# Patient Record
Sex: FEMALE | Race: WHITE | Employment: OTHER | URBAN - METROPOLITAN AREA
[De-identification: names, ages, dates, MRNs, and addresses within clinical notes are randomized per-mention and may not be internally consistent; named-entity substitution may affect disease eponyms.]

---

## 2019-03-12 ENCOUNTER — HOSPITAL ENCOUNTER (EMERGENCY)
Age: 24
Discharge: HOME | End: 2019-03-13
Attending: EMERGENCY MEDICINE
Payer: COMMERCIAL

## 2019-03-12 ENCOUNTER — APPOINTMENT (OUTPATIENT)
Dept: GENERAL RADIOLOGY | Age: 24
End: 2019-03-12
Attending: PHYSICIAN ASSISTANT
Payer: COMMERCIAL

## 2019-03-12 VITALS
DIASTOLIC BLOOD PRESSURE: 67 MMHG | BODY MASS INDEX: 19.15 KG/M2 | OXYGEN SATURATION: 98 % | RESPIRATION RATE: 16 BRPM | TEMPERATURE: 102 F | HEIGHT: 59 IN | HEART RATE: 104 BPM | WEIGHT: 95 LBS | SYSTOLIC BLOOD PRESSURE: 131 MMHG

## 2019-03-12 DIAGNOSIS — J10.1 INFLUENZA A: Primary | ICD-10-CM

## 2019-03-12 DIAGNOSIS — J18.9 COMMUNITY ACQUIRED PNEUMONIA, UNSPECIFIED LATERALITY: ICD-10-CM

## 2019-03-12 LAB
FLUAV AG NPH QL IA: POSITIVE
FLUBV AG NOSE QL IA: NEGATIVE

## 2019-03-12 PROCEDURE — 71046 X-RAY EXAM CHEST 2 VIEWS: CPT

## 2019-03-12 PROCEDURE — 87804 INFLUENZA ASSAY W/OPTIC: CPT

## 2019-03-12 PROCEDURE — 99284 EMERGENCY DEPT VISIT MOD MDM: CPT

## 2019-03-12 PROCEDURE — 87081 CULTURE SCREEN ONLY: CPT

## 2019-03-13 RX ORDER — OSELTAMIVIR PHOSPHATE 6 MG/ML
75 FOR SUSPENSION ORAL 2 TIMES DAILY
Qty: 125 ML | Refills: 0 | Status: SHIPPED | OUTPATIENT
Start: 2019-03-13 | End: 2019-03-18

## 2019-03-13 RX ORDER — AZITHROMYCIN 200 MG/5ML
5 POWDER, FOR SUSPENSION ORAL DAILY
Qty: 12.5 ML | Refills: 0 | Status: SHIPPED | OUTPATIENT
Start: 2019-03-13 | End: 2019-03-13

## 2019-03-13 RX ORDER — TRIPROLIDINE/PSEUDOEPHEDRINE 2.5MG-60MG
800 TABLET ORAL
Status: DISCONTINUED | OUTPATIENT
Start: 2019-03-13 | End: 2019-03-13

## 2019-03-13 RX ORDER — AZITHROMYCIN 200 MG/5ML
500 POWDER, FOR SUSPENSION ORAL DAILY
Qty: 62.5 ML | Refills: 0 | Status: SHIPPED | OUTPATIENT
Start: 2019-03-13 | End: 2019-03-18

## 2019-03-13 RX ORDER — AZITHROMYCIN 200 MG/5ML
500 POWDER, FOR SUSPENSION ORAL
Status: DISCONTINUED | OUTPATIENT
Start: 2019-03-13 | End: 2019-03-13

## 2019-03-13 NOTE — ED NOTES
Father reports feels comfortable with taking child home to administer medications. Father reports did not bring adapter for medication administration.

## 2019-03-13 NOTE — ED TRIAGE NOTES
Pt brought in by father who reports a fever yesterday and today. Father reports has a TBI and is non verbal, father denies vomiting, diarrhea,cough or other symptoms but says that she has had recent exposure to flu.

## 2019-03-13 NOTE — DISCHARGE INSTRUCTIONS
Pneumonia: Care Instructions  Your Care Instructions    Pneumonia is an infection of the lungs. Most cases are caused by infections from bacteria or viruses. Pneumonia may be mild or very severe. If it is caused by bacteria, you will be treated with antibiotics. It may take a few weeks to a few months to recover fully from pneumonia, depending on how sick you were and whether your overall health is good. Follow-up care is a key part of your treatment and safety. Be sure to make and go to all appointments, and call your doctor if you are having problems. It's also a good idea to know your test results and keep a list of the medicines you take. How can you care for yourself at home? · Take your antibiotics exactly as directed. Do not stop taking the medicine just because you are feeling better. You need to take the full course of antibiotics. · Take your medicines exactly as prescribed. Call your doctor if you think you are having a problem with your medicine. · Get plenty of rest and sleep. You may feel weak and tired for a while, but your energy level will improve with time. · To prevent dehydration, drink plenty of fluids, enough so that your urine is light yellow or clear like water. Choose water and other caffeine-free clear liquids until you feel better. If you have kidney, heart, or liver disease and have to limit fluids, talk with your doctor before you increase the amount of fluids you drink. · Take care of your cough so you can rest. A cough that brings up mucus from your lungs is common with pneumonia. It is one way your body gets rid of the infection. But if coughing keeps you from resting or causes severe fatigue and chest-wall pain, talk to your doctor. He or she may suggest that you take a medicine to reduce the cough. · Use a vaporizer or humidifier to add moisture to your bedroom. Follow the directions for cleaning the machine. · Do not smoke or allow others to smoke around you.  Smoke will make your cough last longer. If you need help quitting, talk to your doctor about stop-smoking programs and medicines. These can increase your chances of quitting for good. · Take an over-the-counter pain medicine, such as acetaminophen (Tylenol), ibuprofen (Advil, Motrin), or naproxen (Aleve). Read and follow all instructions on the label. · Do not take two or more pain medicines at the same time unless the doctor told you to. Many pain medicines have acetaminophen, which is Tylenol. Too much acetaminophen (Tylenol) can be harmful. · If you were given a spirometer to measure how well your lungs are working, use it as instructed. This can help your doctor tell how your recovery is going. · To prevent pneumonia in the future, talk to your doctor about getting a flu vaccine (once a year) and a pneumococcal vaccine (one time only for most people). When should you call for help? Call 911 anytime you think you may need emergency care. For example, call if:    · You have severe trouble breathing.    Call your doctor now or seek immediate medical care if:    · You cough up dark brown or bloody mucus (sputum).     · You have new or worse trouble breathing.     · You are dizzy or lightheaded, or you feel like you may faint.    Watch closely for changes in your health, and be sure to contact your doctor if:    · You have a new or higher fever.     · You are coughing more deeply or more often.     · You are not getting better after 2 days (48 hours).     · You do not get better as expected. Where can you learn more? Go to http://maira-jaycob.info/. Enter [id number] in the search box to learn more about \"Pneumonia: Care Instructions. \"  Current as of: September 5, 2018  Content Version: 11.9  © 8039-5585 [employer]. Care instructions adapted under license by [employer] (which disclaims liability or warranty for this information).  If you have questions about a medical condition or this instruction, always ask your healthcare professional. [address] any warranty or liability for your use of this information. Influenza (Flu): Care Instructions  Your Care Instructions    Influenza (flu) is an infection in the lungs and breathing passages. It is caused by the influenza virus. There are different strains, or types, of the flu virus from year to year. Unlike the common cold, the flu comes on suddenly and the symptoms, such as a cough, congestion, fever, chills, fatigue, aches, and pains, are more severe. These symptoms may last up to 10 days. Although the flu can make you feel very sick, it usually doesn't cause serious health problems. Home treatment is usually all you need for flu symptoms. But your doctor may prescribe antiviral medicine to prevent other health problems, such as pneumonia, from developing. Older people and those who have a long-term health condition, such as lung disease, are most at risk for having pneumonia or other health problems. Follow-up care is a key part of your treatment and safety. Be sure to make and go to all appointments, and call your doctor if you are having problems. It's also a good idea to know your test results and keep a list of the medicines you take. How can you care for yourself at home? · Get plenty of rest.  · Drink plenty of fluids, enough so that your urine is light yellow or clear like water. If you have kidney, heart, or liver disease and have to limit fluids, talk with your doctor before you increase the amount of fluids you drink. · Take an over-the-counter pain medicine if needed, such as acetaminophen (Tylenol), ibuprofen (Advil, Motrin), or naproxen (Aleve), to relieve fever, headache, and muscle aches. Read and follow all instructions on the label. No one younger than 20 should take aspirin. It has been linked to Reye syndrome, a serious illness. · Do not smoke.  Smoking can make the flu worse. If you need help quitting, talk to your doctor about stop-smoking programs and medicines. These can increase your chances of quitting for good. · Breathe moist air from a hot shower or from a sink filled with hot water to help clear a stuffy nose. · Before you use cough and cold medicines, check the label. These medicines may not be safe for young children or for people with certain health problems. · If the skin around your nose and lips becomes sore, put some petroleum jelly on the area. · To ease coughing:  ? Drink fluids to soothe a scratchy throat. ? Suck on cough drops or plain hard candy. ? Take an over-the-counter cough medicine that contains dextromethorphan to help you get some sleep. Read and follow all instructions on the label. ? Raise your head at night with an extra pillow. This may help you rest if coughing keeps you awake. · Take any prescribed medicine exactly as directed. Call your doctor if you think you are having a problem with your medicine. To avoid spreading the flu  · Wash your hands regularly, and keep your hands away from your face. · Stay home from school, work, and other public places until you are feeling better and your fever has been gone for at least 24 hours. The fever needs to have gone away on its own without the help of medicine. · Ask people living with you to talk to their doctors about preventing the flu. They may get antiviral medicine to keep from getting the flu from you. · To prevent the flu in the future, get a flu vaccine every fall. Encourage people living with you to get the vaccine. · Cover your mouth when you cough or sneeze. When should you call for help? Call 911 anytime you think you may need emergency care.  For example, call if:    · You have severe trouble breathing.    Call your doctor now or seek immediate medical care if:    · You have new or worse trouble breathing.     · You seem to be getting much sicker.     · You feel very sleepy or confused.     · You have a new or higher fever.     · You get a new rash.    Watch closely for changes in your health, and be sure to contact your doctor if:    · You begin to get better and then get worse.     · You are not getting better after 1 week. Where can you learn more? Go to http://maira-jaycob.info/. Enter A335 in the search box to learn more about \"Influenza (Flu): Care Instructions. \"  Current as of: September 5, 2018  Content Version: 11.9  © 3214-9391 [employer]. Care instructions adapted under license by [employer] (which disclaims liability or warranty for this information). If you have questions about a medical condition or this instruction, always ask your healthcare professional. [address] any warranty or liability for your use of this information.

## 2019-03-13 NOTE — ED PROVIDER NOTES
EMERGENCY DEPARTMENT HISTORY AND PHYSICAL EXAM    Date: 3/12/2019  Patient Name: Adam Zarate    History of Presenting Illness     Chief Complaint   Patient presents with    Fever         History Provided By: patient's father    Chief Complaint: fever   Duration: yesterday   Timing: acute   Location: N/A  Severity: Tmax 103F  Modifying Factors: improved with Tylenol today   Associated Symptoms: congestion, rhinorrhea     Additional History (Context):   10:42 PM  Adam Zarate is a 26 y.o. female with PMHX of TBI who presents to the emergency department with her father C/O fever onset yesterday. Tmax 103F. Associated sxs include congestion, rhinorrhea. Father endorses recent exposure to flu. temperature improved with Tylenol today. Pt's father denies vomiting, diarrhea, cough, changes in urinary habits, and any other sxs or complaints. PCP: Perla Quiroz MD        Past History     Past Medical History:  Past Medical History:   Diagnosis Date    Ill-defined condition     TBI       Past Surgical History:  History reviewed. No pertinent surgical history. Family History:  History reviewed. No pertinent family history. Social History:  Social History     Tobacco Use    Smoking status: Never Smoker    Smokeless tobacco: Never Used   Substance Use Topics    Alcohol use: No     Frequency: Never    Drug use: No       Allergies:  No Known Allergies      Review of Systems   Review of Systems   Constitutional: Positive for fatigue. HENT: Positive for congestion and rhinorrhea. Respiratory: Negative for cough. Gastrointestinal: Negative for diarrhea and vomiting. Genitourinary:        (-) change in urination    All other systems reviewed and are negative.       Physical Exam     Vitals:    03/12/19 2054 03/12/19 2057 03/12/19 2357   BP: 131/81 131/67    Pulse: 94 85 (!) 104   Resp: 18 16    Temp: 99.4 °F (37.4 °C) 99.4 °F (37.4 °C) (!) 102 °F (38.9 °C)   SpO2:   98%   Weight: 43.1 kg (95 lb)     Height: 4' 11\" (1.499 m)       Physical Exam   Constitutional: She is oriented to person, place, and time. She appears well-developed and well-nourished. No distress. Sitting up in wheelchair, non verbal, interactive    HENT:   Head: Normocephalic and atraumatic. Right Ear: Tympanic membrane, external ear and ear canal normal. Tympanic membrane is not perforated, not erythematous, not retracted and not bulging. Left Ear: Tympanic membrane, external ear and ear canal normal. Tympanic membrane is not perforated, not erythematous, not retracted and not bulging. Nose: Nose normal. No mucosal edema or rhinorrhea. Right sinus exhibits no maxillary sinus tenderness and no frontal sinus tenderness. Left sinus exhibits no maxillary sinus tenderness and no frontal sinus tenderness. Mouth/Throat: Uvula is midline, oropharynx is clear and moist and mucous membranes are normal. Mucous membranes are not dry. No uvula swelling. No oropharyngeal exudate, posterior oropharyngeal edema, posterior oropharyngeal erythema or tonsillar abscesses. Neck: Normal range of motion. Neck supple. Cardiovascular: Normal rate, regular rhythm, normal heart sounds and intact distal pulses. No murmur heard. Pulmonary/Chest: Effort normal and breath sounds normal. No respiratory distress. She has no wheezes. She has no rales. Coarse cough, good air movement    Lymphadenopathy:     She has no cervical adenopathy. Neurological: She is alert and oriented to person, place, and time. Skin: Skin is warm and dry. No rash noted. Psychiatric: She has a normal mood and affect. Judgment normal.   Nursing note and vitals reviewed.       Diagnostic Study Results     Labs -     Recent Results (from the past 12 hour(s))   INFLUENZA A & B AG (RAPID TEST)    Collection Time: 03/12/19  8:04 PM   Result Value Ref Range    Influenza A Antigen POSITIVE (A) NEG      Influenza B Antigen NEGATIVE  NEG     STREP THROAT SCREEN    Collection Time: 03/12/19  8:04 PM   Result Value Ref Range    Special Requests: NO SPECIAL REQUESTS      Strep Screen NEGATIVE       Strep Screen (NOTE)  RAPID PERFORMED BY [id number]       Culture result: PENDING        Radiologic Studies -   XR CHEST PA LAT   Final Result   IMPRESSION:         1. Faint bilateral lung opacities, which may reflect atelectasis or infiltrate. 2. Prior median sternotomy. CT Results  (Last 48 hours)    None        CXR Results  (Last 48 hours)               03/12/19 2309  XR CHEST PA LAT Final result    Impression:  IMPRESSION:           1. Faint bilateral lung opacities, which may reflect atelectasis or infiltrate. 2. Prior median sternotomy. Narrative:  EXAM: XR CHEST PA LAT       CLINICAL INDICATION/HISTORY: cough   -Additional: Fever       COMPARISON: None       TECHNIQUE: PA and lateral views of the chest       _______________       FINDINGS:       HEART AND MEDIASTINUM: Normal cardiac size and mediastinal contours. Pulmonary   vascularity within normal limits. Prior median sternotomy. LUNGS AND PLEURAL SPACES: Faint opacities are noted at the lung bases   bilaterally, right slightly more pronounced than left. No pneumothorax or   pleural effusion. BONY THORAX AND SOFT TISSUES: No acute osseous abnormality       _______________                 Medications given in the ED-  Medications - No data to display      Medical Decision Making   I am the first provider for this patient. I reviewed the vital signs, available nursing notes, past medical history, past surgical history, family history and social history. Vital Signs-Reviewed the patient's vital signs. Pulse Oximetry Analysis - 98% on room air     Records Reviewed: Nursing Notes and Old Medical Records    Procedures:  Procedures    ED Course:   10:42 PM Initial assessment performed. The patients presenting problems have been discussed, and they are in agreement with the care plan formulated and outlined with them.   I have encouraged them to ask questions as they arise throughout their visit. Discussion: Pt with hx of brain injury presents with cough and fever. She is alert, interactive, does not appear toxic on exam. Positive flu swab. XR shows possible b/l infiltrates that could be indicative of pneumonia. Pt had gram on Tylenol PTA. Temp spiked during stay. Was planning to give first dose of Tamiflu, azithromycin, and dose of Motrin through G tube button however no liquid Tamiflu available in hospital and equipment needed to give medications also unavailable in hospital. Father states he has plenty of equipment at home. Pt's breathing is unlabored. She is resting comfortably. O2 is 98% on RA. Father is comfortable with discharge, picking up rx following discharge, and administering them along with dose of Motrin at home. Diagnosis and Disposition       DISCHARGE NOTE:  11:59 PM  Tung Bueno results have been reviewed with her father. He has been counseled regarding her diagnosis, treatment, and plan. He verbally conveys understanding and agreement of the signs, symptoms, diagnosis, treatment and prognosis and additionally agrees to follow up as discussed. He also agrees with the care-plan and conveys that all of his questions have been answered. I have also provided discharge instructions for him that include: educational information regarding their diagnosis and treatment, and list of reasons why they would want to return to the ED prior to their follow-up appointment, should her condition change. CLINICAL IMPRESSION:    1. Influenza A    2. Community acquired pneumonia, unspecified laterality        PLAN:  1. D/C Home  2. Discharge Medication List as of 3/13/2019 12:33 AM      START taking these medications    Details   oseltamivir (TAMIFLU) 6 mg/mL suspension Take 12.5 mL by mouth two (2) times a day for 5 days. , Print, Disp-125 mL, R-0      azithromycin (ZITHROMAX) 200 mg/5 mL suspension Take 12.5 mL by mouth daily for 5 days. 500 mg on day one and 250 mg on days 2-5, Print, Disp-62.5 mL, R-0           3. Follow-up Information     Follow up With Specialties Details Why Contact Info    Triston Sanchez MD Family Practice Schedule an appointment as soon as possible for a visit in 1 week For primary care follow up [address] [facility] EMERGENCY DEPT Emergency Medicine Go to As needed, if symptoms worsen [address] [address]  [phone]        _______________________________    Attestations: This note is prepared by Ashwin Blair, acting as Scribe for Myron Tom PA-C. Myron Tom PA-C:  The scribe's documentation has been prepared under my direction and personally reviewed by me in its entirety.   I confirm that the note above accurately reflects all work, treatment, procedures, and medical decision making performed by me.  _______________________________

## 2019-03-15 LAB
B-HEM STREP THROAT QL CULT: NEGATIVE
B-HEM STREP THROAT QL CULT: NORMAL
BACTERIA SPEC CULT: NORMAL
SERVICE CMNT-IMP: NORMAL

## 2019-06-10 ENCOUNTER — HOSPITAL ENCOUNTER (EMERGENCY)
Age: 24
Discharge: HOME | End: 2019-06-10
Attending: EMERGENCY MEDICINE
Payer: COMMERCIAL

## 2019-06-10 ENCOUNTER — APPOINTMENT (OUTPATIENT)
Dept: GENERAL RADIOLOGY | Age: 24
End: 2019-06-10
Attending: EMERGENCY MEDICINE
Payer: COMMERCIAL

## 2019-06-10 VITALS
WEIGHT: 90 LBS | DIASTOLIC BLOOD PRESSURE: 97 MMHG | TEMPERATURE: 96.6 F | RESPIRATION RATE: 20 BRPM | SYSTOLIC BLOOD PRESSURE: 130 MMHG | OXYGEN SATURATION: 100 % | BODY MASS INDEX: 18.18 KG/M2 | HEART RATE: 65 BPM

## 2019-06-10 DIAGNOSIS — S42.035A CLOSED NONDISPLACED FRACTURE OF ACROMIAL END OF LEFT CLAVICLE, INITIAL ENCOUNTER: Primary | ICD-10-CM

## 2019-06-10 PROCEDURE — 73030 X-RAY EXAM OF SHOULDER: CPT

## 2019-06-10 PROCEDURE — L3650 SO 8 ABD RESTRAINT PRE OTS: HCPCS

## 2019-06-10 PROCEDURE — 99283 EMERGENCY DEPT VISIT LOW MDM: CPT

## 2019-06-10 PROCEDURE — 74011000250 HC RX REV CODE- 250: Performed by: EMERGENCY MEDICINE

## 2019-06-10 RX ORDER — GLYCOPYRROLATE 1 MG/1
TABLET ORAL
Refills: 11 | COMMUNITY
Start: 2019-05-24

## 2019-06-10 RX ORDER — LIDOCAINE 4 G/100G
1 PATCH TOPICAL EVERY 24 HOURS
Status: DISCONTINUED | OUTPATIENT
Start: 2019-06-10 | End: 2019-06-10 | Stop reason: HOSPADM

## 2019-06-10 RX ORDER — LIDOCAINE 4 G/100G
PATCH TOPICAL
Qty: 1 PACKAGE | Refills: 0 | Status: SHIPPED | OUTPATIENT
Start: 2019-06-10

## 2019-06-10 RX ORDER — VALPROIC ACID 250 MG/5ML
SOLUTION ORAL
Refills: 5 | COMMUNITY
Start: 2019-03-24

## 2019-06-10 RX ORDER — MEDROXYPROGESTERONE ACETATE 150 MG/ML
INJECTION, SUSPENSION INTRAMUSCULAR
Refills: 0 | COMMUNITY
Start: 2019-05-31

## 2019-06-10 RX ORDER — BACLOFEN 10 MG/1
TABLET ORAL
Refills: 3 | COMMUNITY
Start: 2019-06-02

## 2019-06-10 NOTE — ED NOTES
Shoulder immobilzer applied to LEFT shoulder. Parent at bedside    Discharge instructions reviewed with the parent with opportunity for questions given. The parent verbalized understanding. Patient armband removed and shredded. Patient in stable condition at time of discharge.

## 2019-06-10 NOTE — ED PROVIDER NOTES
EMERGENCY DEPARTMENT HISTORY AND PHYSICAL EXAM    Date: 6/10/2019  Patient Name: Devi Kunz    History of Presenting Illness     Chief Complaint   Patient presents with    Shoulder Injury       History Provided By: Patient's Father    Additional History (Context):   Devi Kunz is a 26 y.o. female with PMHX cerebral palsy, chronically wheelchair-bound presents to the emergency department with dad complaining of a left shoulder bruise that he noticed this morning. Dad reports that the caregiver reported that the child may have bumped her shoulder onto the car when being transported into it on Saturday. Child has been acting appropriately since then according to dad. Dad reports that the child is chronically contracted in her left upper extremity. There is no report of fall    PCP: Silvana Kapoor MD        Past History     Past Medical History:  Past Medical History:   Diagnosis Date    Ill-defined condition     TBI       Past Surgical History:  History reviewed. No pertinent surgical history. Family History:  History reviewed. No pertinent family history. Social History:  Social History     Tobacco Use    Smoking status: Never Smoker    Smokeless tobacco: Never Used   Substance Use Topics    Alcohol use: No     Frequency: Never    Drug use: No       Allergies:  No Known Allergies      Review of Systems   Review of Systems   Constitutional: Negative for chills and fever. Gastrointestinal: Negative for abdominal pain, nausea and vomiting. Musculoskeletal: Positive for arthralgias and myalgias. Negative for joint swelling and neck pain. Neurological: Negative for light-headedness and headaches.        Physical Exam     Vitals:    06/10/19 0808   BP: (!) 130/97   Pulse: 65   Resp: 20   Temp: 96.6 °F (35.9 °C)   SpO2: 100%   Weight: 40.8 kg (90 lb)     Physical Exam   Musculoskeletal:        Arms:      Nursing note and vitals reviewed    Constitutional: Young  female, sitting in a wheelchair, not appearing acutely ill or in acute distress  Head: Normocephalic, Atraumatic  Eyes: Pupils are equal, round, and reactive to light, EOMI  Neck: Supple, non-tender, no cervical midline tenderness, no step-offs or misalignment  Chest: Normal work of breathing and chest excursion bilaterally  Back: No evidence of trauma or deformity  Extremities: No evidence of trauma or deformity, no LE edema, ecchymosis over the left shoulder with no crepitus or deformity, minimal tenderness with palpation, no swelling noted, neurovascularly intact, contracted left upper extremity, no obvious left clavicular crepitus or deformity, no tenderness with palpation  Skin: Warm and dry, normal cap refill  Neuro: Alert and appropriate, CN intact, normal speech, moving all 4 extremities freely and symmetrically  Psychiatric: Normal mood and affect       Diagnostic Study Results     Labs -   No results found for this or any previous visit (from the past 12 hour(s)). Radiologic Studies -   XR SHOULDER LT AP/LAT MIN 2 V   Final Result   IMPRESSION:         1. Nondisplaced fracture of the distal left clavicle without evidence of   acromioclavicular or coracoclavicular malalignment. 2. Nondisplaced fracture of the anterior acromial process. 3. Soft tissue swelling adjacent to the anterior and superior shoulder girdle in   keeping with contusion demonstrated clinically. CT Results  (Last 48 hours)    None        CXR Results  (Last 48 hours)    None            Medical Decision Making   I am the first provider for this patient. I reviewed the vital signs, available nursing notes, past medical history, past surgical history, family history and social history. Vital Signs-Reviewed the patient's vital signs.     Pulse Oximetry Analysis -100 % on room air    Records Reviewed: Nursing Notes and Old Medical Records    Provider Notes:   26 y.o. female with history of CP, chronically wheelchair-bound, presenting with a bruise to her left shoulder. May have possibly hit her shoulder when being transported into a car. On exam patient does not appear acutely ill or in acute distress. Ecchymosis over her left shoulder with no obvious crepitus or deformity. Minimal swelling. Will obtain x-rays of her shoulder. Procedures:  Procedures    ED Course:   8:07 AM  Initial assessment performed. The patients presenting problems have been discussed, and they are in agreement with the care plan formulated and outlined with them. I have encouraged them to ask questions as they arise throughout their visit.    9:09 AM  X-ray showing nondisplaced fracture of the distal left clavicle with nondisplaced fracture of the anterior acromial process. Patient will be placed in a shoulder immobilizer. Urged close follow-up with her PCP and will be referred to orthopedics. Will be discharged with indu andrade for symptom control. Diagnosis and Disposition       DISCHARGE NOTE:  9:09 AM    Jori Armstrong  results have been reviewed with her. She has been counseled regarding her diagnosis, treatment, and plan. She verbally conveys understanding and agreement of the signs, symptoms, diagnosis, treatment and prognosis and additionally agrees to follow up as discussed. She also agrees with the care-plan and conveys that all of her questions have been answered. I have also provided discharge instructions for her that include: educational information regarding their diagnosis and treatment, and list of reasons why they would want to return to the ED prior to their follow-up appointment, should her condition change. She has been provided with education for proper emergency department utilization. CLINICAL IMPRESSION:    1. Closed nondisplaced fracture of acromial end of left clavicle, initial encounter        PLAN:  1. D/C Home  2.    Current Discharge Medication List      START taking these medications    Details   lidocaine 4 % patch Apply to L shoulder BID as needed for pain  Qty: 1 Package, Refills: 0           3. Follow-up Information     Follow up With Specialties Details Why Contact Info    Joaquim Cantu MD Family Practice Schedule an appointment as soon as possible for a visit in 2 days  [address] Prabhakar Isaacs MD Orthopedic Surgery Schedule an appointment as soon as possible for a visit in 2 days  [address] [place] [id number]  [phone] [address] [facility] DEPT Emergency Medicine  As needed if symptoms worsen [address] [address]  [phone]        ____________________________________     Please note that this dictation was completed with [employer], the computer voice recognition software. Quite often unanticipated grammatical, syntax, homophones, and other interpretive errors are inadvertently transcribed by the computer software. Please disregard these errors. Please excuse any errors that have escaped final proofreading.

## 2019-06-10 NOTE — DISCHARGE INSTRUCTIONS
You were seen and evaluated in the Emergency Department. Please understand that your work up is not all encompassing and you should follow up with your primary care physician for further management and continuity of care. Please return to Emergency Department or seek medical attention immediately if you have acute worsening in your symptoms or develop chest pain, shortness of breath, repeated vomiting, fever, altered level of consciousness, coughing up blood, or start sweating and feel clammy. If you were prescribed any medicine for home, please take as prescribed by your health-care provider. If you were given any follow-up appointments or numbers to call, please do so as instructed. Avoid any tobacco products or excessive alcohol. Patient Education        Broken Collarbone: Care Instructions  Your Care Instructions    You have broken or cracked your collarbone, or clavicle. The collarbone is the long, slightly curved bone that connects the shoulder to the chest. It supports the shoulder. A broken collarbone may take 6 weeks or longer to heal. You will need to wear an arm sling to keep the broken bone from moving while it heals. At first, it may hurt to move your arm. This will get better with time. You heal best when you take good care of yourself. Eat a variety of healthy foods, and don't smoke. Follow-up care is a key part of your treatment and safety. Be sure to make and go to all appointments, and call your doctor if you are having problems. It's also a good idea to know your test results and keep a list of the medicines you take. How can you care for yourself at home? · Wear the sling day and night for as long as your doctor tells you to. You may take off the sling when you bathe. When the sling is off, avoid arm positions or motions that cause or increase pain. · Put ice or a cold pack on your collarbone for 10 to 20 minutes at a time.  Try to do this every 1 to 2 hours for the next 3 days (when you are awake) or until the swelling goes down. Put a thin cloth between the ice and your skin. · Be safe with medicines. Take pain medicines exactly as directed. ? If the doctor gave you a prescription medicine for pain, take it as prescribed. ? If you are not taking a prescription pain medicine, ask your doctor if you can take an over-the-counter medicine. ? Do not take two or more pain medicines at the same time unless the doctor told you to. Many pain medicines have acetaminophen, which is Tylenol. Too much acetaminophen (Tylenol) can be harmful. · Try sleeping with pillows propped under your arm for comfort. · After a few days, put your fingers, wrist, and elbow through their full range of motion several times a day. This will keep them from getting stiff. You may get instructions on rehabilitation exercises you can do when your shoulder starts to heal.  · You may use warm packs after the first 3 days for 15 to 20 minutes at a time to ease pain. · You may notice a bump where the collarbone is broken. Over time, the bump will get smaller. A small bump may remain, but it should not affect your arm's strength or movement. When should you call for help? Call your doctor now or seek immediate medical care if:    · Your fingers become numb, tingly, cool, or pale.     · You cannot move your arm.    Watch closely for changes in your health, and be sure to contact your doctor if:    · You have new or increased pain.     · You have new or increased swelling.     · You do not get better as expected. Where can you learn more? Go to http://maira-jaycob.info/. Enter P186 in the search box to learn more about \"Broken Collarbone: Care Instructions. \"  Current as of: September 20, 2018  Content Version: 11.9  © 9923-2283 [employer]. Care instructions adapted under license by [employer] (which disclaims liability or warranty for this information).  If you have questions about a medical condition or this instruction, always ask your healthcare professional. [place] [address] any warranty or liability for your use of this information.